# Patient Record
Sex: FEMALE | Race: WHITE | URBAN - METROPOLITAN AREA
[De-identification: names, ages, dates, MRNs, and addresses within clinical notes are randomized per-mention and may not be internally consistent; named-entity substitution may affect disease eponyms.]

---

## 2019-10-04 ENCOUNTER — OFFICE VISIT (OUTPATIENT)
Dept: URBAN - METROPOLITAN AREA CLINIC 48 | Facility: CLINIC | Age: 74
End: 2019-10-04
Payer: MEDICARE

## 2019-10-04 PROCEDURE — 92014 COMPRE OPH EXAM EST PT 1/>: CPT | Performed by: OPHTHALMOLOGY

## 2019-10-04 ASSESSMENT — INTRAOCULAR PRESSURE
OD: 17
OS: 17

## 2019-10-04 NOTE — IMPRESSION/PLAN
Impression: Subjective visual disturbance: H53.10. Plan: Suspect visual aura, patient has constant headache. Can not rule out  intracranial pathology. Discussed with patient if auras and headaches continue to get worse then may need to send for imaging. No stroke like symptoms. 


RTC  2 weeks follow up and [place] ( long exam)

## 2019-10-18 ENCOUNTER — OFFICE VISIT (OUTPATIENT)
Dept: URBAN - METROPOLITAN AREA CLINIC 48 | Facility: CLINIC | Age: 74
End: 2019-10-18
Payer: MEDICARE

## 2019-10-18 DIAGNOSIS — H53.10 SUBJECTIVE VISUAL DISTURBANCE: Primary | ICD-10-CM

## 2019-10-18 DIAGNOSIS — H26.493 OTHER SECONDARY CATARACT, BILATERAL: ICD-10-CM

## 2019-10-18 PROCEDURE — 92014 COMPRE OPH EXAM EST PT 1/>: CPT | Performed by: OPHTHALMOLOGY

## 2019-10-18 ASSESSMENT — INTRAOCULAR PRESSURE
OD: 18
OS: 17

## 2019-10-18 NOTE — IMPRESSION/PLAN
Impression: Subjective visual disturbance: H53.10. Plan: Happening less, headaches are about the same. Patient to continue appointments with Dr. Isaura Kahn. 

Continue to monitor

## 2019-10-18 NOTE — IMPRESSION/PLAN
Impression: Other secondary cataract, bilateral: H26.493. Plan: Risks, benefits, alternatives, and expectations of YAG capsulotomy were discussed. The patient desires a YAG capsulotomy in both eyes. Schedule YAG in both eyes same day, with po1 1 hour after same day.

## 2019-11-05 ENCOUNTER — SURGERY (OUTPATIENT)
Dept: URBAN - METROPOLITAN AREA SURGERY 26 | Facility: SURGERY | Age: 74
End: 2019-11-05
Payer: MEDICARE

## 2019-11-12 ENCOUNTER — POST-OPERATIVE VISIT (OUTPATIENT)
Dept: URBAN - METROPOLITAN AREA CLINIC 48 | Facility: CLINIC | Age: 74
End: 2019-11-12

## 2019-11-12 DIAGNOSIS — Z09 ENCNTR FOR F/U EXAM AFT TRTMT FOR COND OTH THAN MALIG NEOPLM: Primary | ICD-10-CM

## 2019-11-12 PROCEDURE — 99024 POSTOP FOLLOW-UP VISIT: CPT | Performed by: OPHTHALMOLOGY

## 2019-11-12 ASSESSMENT — INTRAOCULAR PRESSURE
OS: 13
OD: 15

## 2020-01-01 ENCOUNTER — OFFICE VISIT (OUTPATIENT)
Dept: URBAN - METROPOLITAN AREA CLINIC 48 | Facility: CLINIC | Age: 75
End: 2020-01-01
Payer: MEDICARE

## 2020-01-01 DIAGNOSIS — H35.362 DRUSEN OF MACULA OF LEFT EYE: Primary | ICD-10-CM

## 2020-01-01 PROCEDURE — 92014 COMPRE OPH EXAM EST PT 1/>: CPT | Performed by: OPHTHALMOLOGY

## 2020-01-01 PROCEDURE — 92134 CPTRZ OPH DX IMG PST SGM RTA: CPT | Performed by: OPHTHALMOLOGY

## 2020-01-01 ASSESSMENT — INTRAOCULAR PRESSURE
OS: 19
OD: 19

## 2020-12-29 NOTE — IMPRESSION/PLAN
Impression: Drusen of macula of left eye: H35.362. Plan: Discussed Amsler grid screening with patient patient has any changes on Amsler grid patient to call office to be see sooner. Patient to talk to PCP to see what vitamins she is able to take. 

RTC 9 months DE/OCT mac ( long exam)

## 2020-12-29 NOTE — IMPRESSION/PLAN
Impression: Dermatochalasis of right upper eyelid: H02.831.  Plan: Refer to Mell Quintana next available with VF ptosis prior

## 2021-01-01 ENCOUNTER — OFFICE VISIT (OUTPATIENT)
Dept: URBAN - METROPOLITAN AREA CLINIC 48 | Facility: CLINIC | Age: 76
End: 2021-01-01
Payer: MEDICARE

## 2021-01-01 ENCOUNTER — SURGERY (OUTPATIENT)
Dept: URBAN - METROPOLITAN AREA SURGERY 26 | Facility: SURGERY | Age: 76
End: 2021-01-01
Payer: MEDICARE

## 2021-01-01 DIAGNOSIS — H02.831 DERMATOCHALASIS OF RIGHT UPPER EYELID: Primary | ICD-10-CM

## 2021-01-01 DIAGNOSIS — H02.834 DERMATOCHALASIS OF LEFT UPPER EYELID: ICD-10-CM

## 2021-01-01 DIAGNOSIS — E11.9 TYPE 2 DIABETES MELLITUS W/O COMPLICATION: Primary | ICD-10-CM

## 2021-01-01 DIAGNOSIS — Z48.89 ENCOUNTER FOR OTHER SPECIFIED SURGICAL AFTERCARE: Primary | ICD-10-CM

## 2021-01-01 PROCEDURE — 92014 COMPRE OPH EXAM EST PT 1/>: CPT | Performed by: OPHTHALMOLOGY

## 2021-01-01 PROCEDURE — 92285 EXTERNAL OCULAR PHOTOGRAPHY: CPT | Performed by: OPHTHALMOLOGY

## 2021-01-01 PROCEDURE — 92081 LIMITED VISUAL FIELD XM: CPT | Performed by: OPHTHALMOLOGY

## 2021-01-01 PROCEDURE — 99024 POSTOP FOLLOW-UP VISIT: CPT | Performed by: OPHTHALMOLOGY

## 2021-01-01 PROCEDURE — 99214 OFFICE O/P EST MOD 30 MIN: CPT | Performed by: OPHTHALMOLOGY

## 2021-01-01 RX ORDER — CEPHALEXIN 500 MG/1
500 MG CAPSULE ORAL
Qty: 15 | Refills: 0 | Status: INACTIVE
Start: 2021-01-01 | End: 2021-01-01

## 2021-01-01 RX ORDER — BIMATOPROST 0.3 MG/ML
0.03 % SOLUTION/ DROPS OPHTHALMIC
Qty: 1 | Refills: 12 | Status: ACTIVE
Start: 2021-01-01

## 2021-01-01 RX ORDER — TRAMADOL HYDROCHLORIDE 50 MG/1
50 MG TABLET, FILM COATED ORAL
Qty: 20 | Refills: 0 | Status: INACTIVE
Start: 2021-01-01 | End: 2021-01-01

## 2021-01-01 RX ORDER — ERYTHROMYCIN 5 MG/G
OINTMENT OPHTHALMIC
Qty: 2 | Refills: 0 | Status: ACTIVE
Start: 2021-01-01

## 2021-01-01 ASSESSMENT — INTRAOCULAR PRESSURE
OD: 15
OS: 14
OD: 13
OD: 12
OS: 15
OS: 15

## 2021-01-26 NOTE — IMPRESSION/PLAN
Impression: Dermatochalasis of right upper eyelid: H02.831. Photo Interpretation: supports clinical findings and dx documented in chart. Plan: Discussed diagnosis in detail with patient. Discussed treatment options with patient. Surgical risks and benefits were discussed, explained and understood by patient. Surgical treatment is required. Patient elects to proceed with surgery. HVF 36 superior taped and untaped reviewed today. Condition is visually significant, Recommend Bilateral Upper Eyelid Blepharoplasty. RL3. *Need clearance from Cardiologist and Pulmonologist. Need stop and start dates for Aspirin. *Patient is currently being treated during COVID-19 epidemic, which limits our availability to establish proper follow up care. Patient understands these limitations, and is aware that we will continue treatment and follow up based on our availability, as determined by local state and federal guidelines.

## 2021-01-26 NOTE — ASSESSMENT/PLAN
Impression: Visual Field - OD: Good-Untaped: 15 degrees of superior isopter visual field obstruction Taped: improved visual field obstruction; OS: Good-Untaped: 15 degrees of superior isopter visual field obstruction Taped: improved visual field obstruction Plan: See plan 1.

## 2021-03-18 NOTE — IMPRESSION/PLAN
Impression: S/P Both Upper Eyelid Blepharoplasty; FADI lesion removal OD - 3 Days. Encounter for other specified surgical aftercare  Z48.89. Plan: Continue using Erythromycin Rudy TID OU
continue cold compress as often as possible Advised patient to take Ciprofloxacin 250mg BID PO - if nausea continues or worsens, give us a call Keep aba with Dr. Nydia Soriano next week

## 2021-03-23 NOTE — IMPRESSION/PLAN
Impression: S/P Bilateral Upper Eyelid Blepharoplasty; FADI lesion removal OD - 8 Days. Encounter for other specified surgical aftercare  Z48.89.

*Patient is currently being treated during COVID-19 epidemic, which limits our availability to establish proper follow up care. Patient understands these limitations, and is aware that we will continue treatment and follow up based on our availability, as determined by local state and federal guidelines. Plan: -- Patient to continue Erythromycin ariella at night, for two weeks -- Patient to begin warm compresses BID, for two weeks -- Patient instructed to wash with mild soap and warm water -- Patient to gradually resume normal activities -- Expressed importance of UV Protection

## 2021-05-18 NOTE — IMPRESSION/PLAN
Impression: S/P Bilateral Upper Eyelid Blepharoplasty; FADI lesion removal OD - 64 Days. Encounter for other specified surgical aftercare  Z48.89.

*Patient is currently being treated during COVID-19 epidemic, which limits our availability to establish proper follow up care. Patient understands these limitations, and is aware that we will continue treatment and follow up based on our availability, as determined by local state and federal guidelines.  Plan: -- Expressed importance of UV Protection, 1 year

## 2021-09-28 NOTE — IMPRESSION/PLAN
Impression: Type 2 diabetes mellitus w/o complication: D61.5.  Plan: No diabetic retinopathy on today's exam

Recommend yearly diabetic exam with Dr. Abiola Dubose